# Patient Record
Sex: MALE | Race: BLACK OR AFRICAN AMERICAN | NOT HISPANIC OR LATINO | URBAN - METROPOLITAN AREA
[De-identification: names, ages, dates, MRNs, and addresses within clinical notes are randomized per-mention and may not be internally consistent; named-entity substitution may affect disease eponyms.]

---

## 2023-06-20 ENCOUNTER — EMERGENCY (EMERGENCY)
Facility: HOSPITAL | Age: 38
LOS: 1 days | Discharge: HOME | End: 2023-06-20
Attending: EMERGENCY MEDICINE
Payer: COMMERCIAL

## 2023-06-20 VITALS
RESPIRATION RATE: 20 BRPM | SYSTOLIC BLOOD PRESSURE: 138 MMHG | DIASTOLIC BLOOD PRESSURE: 99 MMHG | TEMPERATURE: 98 F | HEART RATE: 75 BPM | WEIGHT: 214.95 LBS | HEIGHT: 69 IN | OXYGEN SATURATION: 97 %

## 2023-06-20 VITALS
TEMPERATURE: 98 F | DIASTOLIC BLOOD PRESSURE: 68 MMHG | OXYGEN SATURATION: 99 % | SYSTOLIC BLOOD PRESSURE: 112 MMHG | RESPIRATION RATE: 15 BRPM | HEART RATE: 68 BPM

## 2023-06-20 LAB
ALBUMIN SERPL ELPH-MCNC: 4.3 G/DL — SIGNIFICANT CHANGE UP (ref 3.3–5)
ALP SERPL-CCNC: 61 U/L — SIGNIFICANT CHANGE UP (ref 40–120)
ALT FLD-CCNC: 15 U/L — SIGNIFICANT CHANGE UP (ref 10–45)
ANION GAP SERPL CALC-SCNC: 15 MMOL/L — SIGNIFICANT CHANGE UP (ref 5–17)
APPEARANCE UR: ABNORMAL
AST SERPL-CCNC: 19 U/L — SIGNIFICANT CHANGE UP (ref 10–40)
BACTERIA # UR AUTO: NEGATIVE — SIGNIFICANT CHANGE UP
BASOPHILS # BLD AUTO: 0 K/UL — SIGNIFICANT CHANGE UP (ref 0–0.2)
BASOPHILS NFR BLD AUTO: 0 % — SIGNIFICANT CHANGE UP (ref 0–2)
BILIRUB SERPL-MCNC: 0.6 MG/DL — SIGNIFICANT CHANGE UP (ref 0.2–1.2)
BILIRUB UR-MCNC: NEGATIVE — SIGNIFICANT CHANGE UP
BUN SERPL-MCNC: 20 MG/DL — SIGNIFICANT CHANGE UP (ref 7–23)
CALCIUM SERPL-MCNC: 9.2 MG/DL — SIGNIFICANT CHANGE UP (ref 8.4–10.5)
CHLORIDE SERPL-SCNC: 102 MMOL/L — SIGNIFICANT CHANGE UP (ref 96–108)
CO2 SERPL-SCNC: 19 MMOL/L — LOW (ref 22–31)
COLOR SPEC: YELLOW — SIGNIFICANT CHANGE UP
CREAT SERPL-MCNC: 0.9 MG/DL — SIGNIFICANT CHANGE UP (ref 0.5–1.3)
DIFF PNL FLD: ABNORMAL
EGFR: 111 ML/MIN/1.73M2 — SIGNIFICANT CHANGE UP
EOSINOPHIL # BLD AUTO: 0.33 K/UL — SIGNIFICANT CHANGE UP (ref 0–0.5)
EOSINOPHIL NFR BLD AUTO: 2.6 % — SIGNIFICANT CHANGE UP (ref 0–6)
EPI CELLS # UR: 2 /HPF — SIGNIFICANT CHANGE UP
GIANT PLATELETS BLD QL SMEAR: PRESENT — SIGNIFICANT CHANGE UP
GLUCOSE SERPL-MCNC: 124 MG/DL — HIGH (ref 70–99)
GLUCOSE UR QL: NEGATIVE — SIGNIFICANT CHANGE UP
HCT VFR BLD CALC: 40.2 % — SIGNIFICANT CHANGE UP (ref 39–50)
HGB BLD-MCNC: 13.2 G/DL — SIGNIFICANT CHANGE UP (ref 13–17)
HYALINE CASTS # UR AUTO: 2 /LPF — SIGNIFICANT CHANGE UP (ref 0–2)
KETONES UR-MCNC: NEGATIVE — SIGNIFICANT CHANGE UP
LEUKOCYTE ESTERASE UR-ACNC: ABNORMAL
LIDOCAIN IGE QN: 34 U/L — SIGNIFICANT CHANGE UP (ref 7–60)
LYMPHOCYTES # BLD AUTO: 1.65 K/UL — SIGNIFICANT CHANGE UP (ref 1–3.3)
LYMPHOCYTES # BLD AUTO: 12.9 % — LOW (ref 13–44)
MANUAL SMEAR VERIFICATION: SIGNIFICANT CHANGE UP
MCHC RBC-ENTMCNC: 27.2 PG — SIGNIFICANT CHANGE UP (ref 27–34)
MCHC RBC-ENTMCNC: 32.8 GM/DL — SIGNIFICANT CHANGE UP (ref 32–36)
MCV RBC AUTO: 82.7 FL — SIGNIFICANT CHANGE UP (ref 80–100)
MONOCYTES # BLD AUTO: 0.88 K/UL — SIGNIFICANT CHANGE UP (ref 0–0.9)
MONOCYTES NFR BLD AUTO: 6.9 % — SIGNIFICANT CHANGE UP (ref 2–14)
NEUTROPHILS # BLD AUTO: 9.94 K/UL — HIGH (ref 1.8–7.4)
NEUTROPHILS NFR BLD AUTO: 77.6 % — HIGH (ref 43–77)
NITRITE UR-MCNC: NEGATIVE — SIGNIFICANT CHANGE UP
PH UR: 7.5 — SIGNIFICANT CHANGE UP (ref 5–8)
PLAT MORPH BLD: NORMAL — SIGNIFICANT CHANGE UP
PLATELET # BLD AUTO: 217 K/UL — SIGNIFICANT CHANGE UP (ref 150–400)
POTASSIUM SERPL-MCNC: 4 MMOL/L — SIGNIFICANT CHANGE UP (ref 3.5–5.3)
POTASSIUM SERPL-SCNC: 4 MMOL/L — SIGNIFICANT CHANGE UP (ref 3.5–5.3)
PROT SERPL-MCNC: 7.2 G/DL — SIGNIFICANT CHANGE UP (ref 6–8.3)
PROT UR-MCNC: ABNORMAL
RBC # BLD: 4.86 M/UL — SIGNIFICANT CHANGE UP (ref 4.2–5.8)
RBC # FLD: 13.3 % — SIGNIFICANT CHANGE UP (ref 10.3–14.5)
RBC BLD AUTO: NORMAL — SIGNIFICANT CHANGE UP
RBC CASTS # UR COMP ASSIST: 212 /HPF — HIGH (ref 0–4)
SODIUM SERPL-SCNC: 136 MMOL/L — SIGNIFICANT CHANGE UP (ref 135–145)
SP GR SPEC: 1.02 — SIGNIFICANT CHANGE UP (ref 1.01–1.02)
UROBILINOGEN FLD QL: NEGATIVE — SIGNIFICANT CHANGE UP
WBC # BLD: 12.81 K/UL — HIGH (ref 3.8–10.5)
WBC # FLD AUTO: 12.81 K/UL — HIGH (ref 3.8–10.5)
WBC UR QL: 25 /HPF — HIGH (ref 0–5)

## 2023-06-20 PROCEDURE — 99283 EMERGENCY DEPT VISIT LOW MDM: CPT

## 2023-06-20 PROCEDURE — 74177 CT ABD & PELVIS W/CONTRAST: CPT | Mod: 26,MA

## 2023-06-20 PROCEDURE — 74177 CT ABD & PELVIS W/CONTRAST: CPT | Mod: MA

## 2023-06-20 PROCEDURE — 99284 EMERGENCY DEPT VISIT MOD MDM: CPT | Mod: 25

## 2023-06-20 PROCEDURE — 99284 EMERGENCY DEPT VISIT MOD MDM: CPT

## 2023-06-20 PROCEDURE — 87186 SC STD MICRODIL/AGAR DIL: CPT

## 2023-06-20 PROCEDURE — 96375 TX/PRO/DX INJ NEW DRUG ADDON: CPT

## 2023-06-20 PROCEDURE — 87086 URINE CULTURE/COLONY COUNT: CPT

## 2023-06-20 PROCEDURE — 80053 COMPREHEN METABOLIC PANEL: CPT

## 2023-06-20 PROCEDURE — 81001 URINALYSIS AUTO W/SCOPE: CPT

## 2023-06-20 PROCEDURE — 83690 ASSAY OF LIPASE: CPT

## 2023-06-20 PROCEDURE — 85025 COMPLETE CBC W/AUTO DIFF WBC: CPT

## 2023-06-20 PROCEDURE — 99053 MED SERV 10PM-8AM 24 HR FAC: CPT

## 2023-06-20 PROCEDURE — 96374 THER/PROPH/DIAG INJ IV PUSH: CPT | Mod: XU

## 2023-06-20 PROCEDURE — 87077 CULTURE AEROBIC IDENTIFY: CPT

## 2023-06-20 RX ORDER — KETOROLAC TROMETHAMINE 30 MG/ML
15 SYRINGE (ML) INJECTION ONCE
Refills: 0 | Status: DISCONTINUED | OUTPATIENT
Start: 2023-06-20 | End: 2023-06-20

## 2023-06-20 RX ORDER — CEPHALEXIN 500 MG
1 CAPSULE ORAL
Qty: 28 | Refills: 0
Start: 2023-06-20 | End: 2023-06-26

## 2023-06-20 RX ORDER — CEFPODOXIME PROXETIL 100 MG
1 TABLET ORAL
Qty: 14 | Refills: 0
Start: 2023-06-20 | End: 2023-06-26

## 2023-06-20 RX ORDER — CEFTRIAXONE 500 MG/1
1000 INJECTION, POWDER, FOR SOLUTION INTRAMUSCULAR; INTRAVENOUS ONCE
Refills: 0 | Status: COMPLETED | OUTPATIENT
Start: 2023-06-20 | End: 2023-06-20

## 2023-06-20 RX ORDER — TAMSULOSIN HYDROCHLORIDE 0.4 MG/1
1 CAPSULE ORAL
Qty: 14 | Refills: 0
Start: 2023-06-20 | End: 2023-07-03

## 2023-06-20 RX ORDER — SODIUM CHLORIDE 9 MG/ML
1000 INJECTION INTRAMUSCULAR; INTRAVENOUS; SUBCUTANEOUS ONCE
Refills: 0 | Status: COMPLETED | OUTPATIENT
Start: 2023-06-20 | End: 2023-06-20

## 2023-06-20 RX ADMIN — SODIUM CHLORIDE 1000 MILLILITER(S): 9 INJECTION INTRAMUSCULAR; INTRAVENOUS; SUBCUTANEOUS at 02:56

## 2023-06-20 RX ADMIN — CEFTRIAXONE 100 MILLIGRAM(S): 500 INJECTION, POWDER, FOR SOLUTION INTRAMUSCULAR; INTRAVENOUS at 05:04

## 2023-06-20 RX ADMIN — Medication 15 MILLIGRAM(S): at 02:55

## 2023-06-20 NOTE — CONSULT NOTE ADULT - SUBJECTIVE AND OBJECTIVE BOX
UROLOGY CONSULT NOTE    HPI:  This is a 37y old Male with no PMHx who presents to the ER with right lower abdominal and flank pain for the past 12 hours.  He states that the papin began around 6PM and has not improved, prompting him to visit the ER.  He admits to seeing "specks of blood" in his urine but denies fevers, chills, N/V, dysuria.  His pain is currently controlled.      PAST MEDICAL HISTORY    No pertinent past medical history        PAST SURGICAL HISTORY    No significant past surgical history        FAMILY HISTORY    none    SOCIAL HISTORY    no history of smoking    HOME MEDICATIONS    none    DRUG ALLERGIES    No Known Allergies      REVIEW OF SYSTEMS: Pertinent positives and negatives as stated in HPI, otherwise negative      VITAL SIGNS    T(F): 97.9, Max: 98.4 (23 @ 01:11)  HR: 75  BP: 109/72  RR: 18  SpO2: 98%        PHYSICAL EXAM    Gen: Well groomed, well dressed, well nourished  Abd: Soft, NT/ND  Back: no CVAT bilaterally  Ext: No edema present b/l      LABS:                        13.2   12.81 )-----------( 217               40.2     136  |  102  |  20  ----------------------------<  124  4.0   |  19  |  0.90    Ca    9.2    TPro  7.2  /  Alb  4.3  /  TBili  0.6  /  DBili  x   /  AST  19  /  ALT  15  /  AlkPhos  61          Urinalysis Basic:    Color: Yellow / Appearance: Slightly Turbid / S.022 / pH: x  Gluc: x / Ketone: Negative  / Bili: Negative / Urobili: Negative   Blood: x / Protein: 30 mg/dL / Nitrite: Negative   Leuk Esterase: Moderate / RBC: 212 /hpf / WBC 25 /HPF   Sq Epi: x / Non Sq Epi: x / Bacteria: Negative      Urine culture: pending results      IMAGING:    CT: Mild right hydroureteronephrosis and obstructive uropathy caused by a 3   mm calculus at the right ureterovesical junction.    Mild to moderate right perinephric free fluid compatible with   calyceal/forniceal rupture.  No drainable fluid collection.    Incompletely distended bladder with mild wall thickening and mild   perivesicular fat stranding.  Superimposed genitourinary infection and/or   cystitis should be excluded based on clinical symptoms and laboratory   values.   UROLOGY CONSULT NOTE    HPI:  This is a 37y old Male with no PMHx who presents to the ER with right lower abdominal and flank pain for the past 12 hours.  He states that the pain began around 6PM and has not improved, prompting him to visit the ER.  He admits to seeing "specks of blood" in his urine but denies fevers, chills, N/V, dysuria.  His pain is currently controlled.      PAST MEDICAL HISTORY    No pertinent past medical history        PAST SURGICAL HISTORY    No significant past surgical history        FAMILY HISTORY    none    SOCIAL HISTORY    no history of smoking    HOME MEDICATIONS    none    DRUG ALLERGIES    No Known Allergies      REVIEW OF SYSTEMS: Pertinent positives and negatives as stated in HPI, otherwise negative      VITAL SIGNS    T(F): 97.9, Max: 98.4 (23 @ 01:11)  HR: 75  BP: 109/72  RR: 18  SpO2: 98%        PHYSICAL EXAM    Gen: Well groomed, well dressed, well nourished  Abd: Soft, NT/ND  Back: no CVAT bilaterally  Ext: No edema present b/l      LABS:                        13.2   12.81 )-----------( 217               40.2     136  |  102  |  20  ----------------------------<  124  4.0   |  19  |  0.90    Ca    9.2    TPro  7.2  /  Alb  4.3  /  TBili  0.6  /  DBili  x   /  AST  19  /  ALT  15  /  AlkPhos  61          Urinalysis Basic:    Color: Yellow / Appearance: Slightly Turbid / S.022 / pH: x  Gluc: x / Ketone: Negative  / Bili: Negative / Urobili: Negative   Blood: x / Protein: 30 mg/dL / Nitrite: Negative   Leuk Esterase: Moderate / RBC: 212 /hpf / WBC 25 /HPF   Sq Epi: x / Non Sq Epi: x / Bacteria: Negative      Urine culture: pending results      IMAGING:    CT: Mild right hydroureteronephrosis and obstructive uropathy caused by a 3   mm calculus at the right ureterovesical junction.    Mild to moderate right perinephric free fluid compatible with   calyceal/forniceal rupture.  No drainable fluid collection.    Incompletely distended bladder with mild wall thickening and mild   perivesicular fat stranding.  Superimposed genitourinary infection and/or   cystitis should be excluded based on clinical symptoms and laboratory   values.

## 2023-06-20 NOTE — ED PROVIDER NOTE - NSFOLLOWUPINSTRUCTIONS_ED_ALL_ED_FT
Follow up with the Urology Clinic within 2 weeks. Use the contact information provided below to make the appointment. Inform the people making the appointment that you were in the Emergency Department, this will expedite your appointment. Call the Emergency Department if you have difficulties getting your appointment. The Emergency Department's phone number can be found on the upper left hand side of this paperwork.     ***Urology Clinic:   Dr. Sidney Hudson   [facility]   [address] [place] [address] [id number], [place] [address]  [phone]    ***Immediately return to the Emergency Department for any new or markedly worsening symptoms.    ***Strain your urine with the strainer provided to you here. If you capture the stone, store it in the container given to you here. Bring this to your urology appointment.     *** the prescriptions for cephalexin and tamsulosin sent to your pharmacy within the next 12 hours. Take all new and previous medications as prescribed.    ***Alternate between Tylenol and Ibuprofen. Take 1 g of Tylenol, 4 hours later take 600 mg of Ibuprofen, 4 hours after this take 1 g of Tylenol. Continuing alternating like this as needed for pain. If you do not have pain, you do not need to take this medication.

## 2023-06-20 NOTE — ED PROVIDER NOTE - PATIENT PORTAL LINK FT
You can access the FollowMyHealth Patient Portal offered by [facility] by registering at the following website: [email or web address]. By joining [email or web address]’s FollowMyHealth portal, you will also be able to view your health information using other applications (apps) compatible with our system.

## 2023-06-20 NOTE — ED PROVIDER NOTE - PROGRESS NOTE DETAILS
CT scan shows 3 mm UVJ stone.  UA concerning for UTI versus bacteria from septic stone.  Urology consulted and will evaluate patient. Ceftriaxone ordered. Mateo Webber MD (PGY-2): Urology evaluated patient, recommendations are antibiotics and Flomax.  Follow-up in their clinic in 2 weeks.  Discussed with patient, p.o. challenge, successfully, will discharge at this time with return instructions, follow-up instructions and pain management regimen as well as prescriptions for Keflex and Flomax.  All questions answered prior to discharge.  Patient and family are happy with plan of care.

## 2023-06-20 NOTE — ED ADULT NURSE REASSESSMENT NOTE - NS ED NURSE REASSESS COMMENT FT1
Report received from Keegan IGNACIO. As per family at bedside reports that patient was seen by urology and "they said we were okay for discharge." RN spoke with ER MD, as per ER MD, urology needs to speak to attending for final OK. RN updated patient and patient family member that plan of care. Verbalized understanding. Safety and comfort provided.

## 2023-06-20 NOTE — ED PROVIDER NOTE - CLINICAL SUMMARY MEDICAL DECISION MAKING FREE TEXT BOX
37-year-old male without significant past medical history presents with right flank and RLQ abdominal pain for the last 7 to 8 hours associated with burning with urination and hematuria.  Concern for kidney stone, lower likelihood appendicitis.  Labs, CTAP, Toradol, UA/urine culture ordered.

## 2023-06-20 NOTE — CONSULT NOTE ADULT - ASSESSMENT
37 year old male with a 3mm right ureteral calculus    -  -  -  - 37 year old male with a 3mm right ureteral calculus    -PO hydration  -flomax qd  -analgesia as needed  -can send home with antibiotics  -pt may follow up with Dr Sidney Hudson at the [facility] [phone] if the stone does not pass within 2 weeks  -case d/w Dr Hernandez

## 2023-06-20 NOTE — ED PROVIDER NOTE - PHYSICAL EXAMINATION
GENERAL: well appearing in no acute distress, non-toxic appearing  HEAD: normocephalic, atraumatic  HEENT: normal conjunctiva, oral mucosa moist, uvula midline  CARDIAC: regular rate and rhythm, normal S1S2, no appreciable murmurs, 2+ pulses in UE/LE b/l  PULM: normal breath sounds, clear to ascultation bilaterally, no rales, rhonchi, wheezing  GI: abdomen nondistended, soft, TTP RLQ  : right CVA tenderness b/l, mild suprapubic tenderness  NEURO: moving all 4 extremities, no focal deficits, normal speech, AAOx3   MSK: no peripheral edema, no calf tenderness b/l  SKIN: well-perfused, extremities warm  PSYCH: appropriate mood and affect

## 2023-06-20 NOTE — ED PROVIDER NOTE - ATTENDING CONTRIBUTION TO CARE
MD Novak:  patient seen and evaluated personally.   I agree with the History & Physical,  Impression & Plan other than what was detailed in my note.  MD Novak  36 y/o m no sig pmh presenting to ed w/ cc r lower abd pain, going to his back and to his testicles, started last night, came on suddenly, has not had before, no hx of k/g stones, no abd surgeries in past, afebrile vitals stable  non toxic well appearing, NC/AT,  conjunctiva non conjected, sclera anicteric, moist mucous membranes, neck supple, heart sounds, normal, no mrg, lungs cta b/l no wrr, abd soft non distended w/ equivocal rlq tenderness, no visual deformities of extremities, axox3,  normal mood and affect, pt has reported hematuria, no nv, likely k stone, appendicitis lower on diff, uti pyelo less likely will get cbc, cmp, ct, ua, gu exam.

## 2023-06-20 NOTE — ED ADULT NURSE NOTE - OBJECTIVE STATEMENT
Pt is 36 y/o male, presenting to the ED c/o abdominal pain since 5 pm. As per pt, he began having R sided- flank pain that radiates to RLQ. Pt also states he has had blood in his urine and dysuria. Pt denies pertinent PMH or daily medication use. Upon assessment, pt AxOx3, sitting up in stretcher and speaking in full sentences. Breathing spontaneously and unlabored, >95% RA. Abdomen soft and nontender to palpation. Pt ambulates w/o difficultly and moves all extremities w/ = strength. Skin is warm, dry, and intact w/ + peripheral pulses. Pt denies SOB, chest pain, n/v/d, dizziness, vision changes, chills, and fever. Safety and comfort measures provided- bed in lowest position, locked, and blanket given.

## 2023-06-20 NOTE — ED PROVIDER NOTE - OBJECTIVE STATEMENT
37-year-old male without significant past medical history presents with right-sided flank and right lower quadrant abdominal pain.  Patient reports that the pain has been going on since about 5 or 6 PM earlier this evening.  Patient has noticed that he has some pain and burning with urination also associated with hematuria.  Patient has no history of kidney stones or urinary problems.  Patient has had some nausea but no vomiting.  Otherwise patient has no headache, chest pain, shortness of breath, diarrhea, peripheral edema, fever/chills.

## 2023-06-20 NOTE — CONSULT NOTE ADULT - NSCONSULTADDITIONALINFOA_GEN_ALL_CORE
I have examined the patient,  chart reviewed, lab studies reviewed, relevant imaging studies evaluated. I agree with the plan as outlined above.

## 2023-06-22 NOTE — ED POST DISCHARGE NOTE - ADDITIONAL DOCUMENTATION
from above 6/22/23:  I spoke with the patient who reports feeling improvement in symptoms since his ED visit.  Still has some right-sided flank pain.  Patient denies fevers, chills, vomiting, reports feeling well.  Patient advised on need to change antibiotics based on sensitivities, will sending Augmentin to preferred pharmacy.  Patient advised on strict return precautions.  Patient reports he has not yet called urology to make an appointment.  I spoke to the referral coordinator who will assist patient in making a urology appointment for next week. -Neil Law PA-C

## 2023-06-22 NOTE — ED POST DISCHARGE NOTE - RESULT SUMMARY
Urine culture greater than 100 K Staph epidermidis, 10-49 K enteric coccus, only 2 epithelial cells noted in UA.  Patient discharged on cephalexin.  Patient had CT in ED showing obstructive uropathy, 3 mm calculus at right UVJ, mild right hydroureteronephrosis, and evidence of right calyceal rupture.  I discussed with urology PA reports if patient having worsening symptoms, fevers, chills or feeling unwell when advised to return to ED immediately, however patient improving may be able to have stent performed outpatient. -Neil Law PA-C

## 2023-06-23 PROBLEM — Z78.9 OTHER SPECIFIED HEALTH STATUS: Chronic | Status: ACTIVE | Noted: 2023-06-20

## 2023-06-26 ENCOUNTER — APPOINTMENT (OUTPATIENT)
Dept: UROLOGY | Facility: HOSPITAL | Age: 38
End: 2023-06-26